# Patient Record
Sex: FEMALE | Race: BLACK OR AFRICAN AMERICAN
[De-identification: names, ages, dates, MRNs, and addresses within clinical notes are randomized per-mention and may not be internally consistent; named-entity substitution may affect disease eponyms.]

---

## 2021-06-30 ENCOUNTER — HOSPITAL ENCOUNTER (EMERGENCY)
Dept: HOSPITAL 63 - ER | Age: 31
Discharge: HOME | End: 2021-06-30
Payer: COMMERCIAL

## 2021-06-30 VITALS — BODY MASS INDEX: 32.56 KG/M2 | WEIGHT: 190.7 LBS | HEIGHT: 64 IN

## 2021-06-30 VITALS — DIASTOLIC BLOOD PRESSURE: 75 MMHG | SYSTOLIC BLOOD PRESSURE: 126 MMHG

## 2021-06-30 DIAGNOSIS — O99.331: ICD-10-CM

## 2021-06-30 DIAGNOSIS — Z3A.01: ICD-10-CM

## 2021-06-30 DIAGNOSIS — O99.711: Primary | ICD-10-CM

## 2021-06-30 DIAGNOSIS — L73.2: ICD-10-CM

## 2021-06-30 LAB
APTT PPP: YELLOW S
BACTERIA #/AREA URNS HPF: (no result) /HPF
BILIRUB UR QL STRIP: (no result)
FIBRINOGEN PPP-MCNC: (no result) MG/DL
GLUCOSE UR STRIP-MCNC: (no result) MG/DL
NITRITE UR QL STRIP: (no result)
RBC #/AREA URNS HPF: (no result) /HPF (ref 0–2)
SP GR UR STRIP: 1.01
SQUAMOUS #/AREA URNS LPF: (no result) /LPF
UROBILINOGEN UR-MCNC: 0.2 MG/DL
WBC #/AREA URNS HPF: (no result) /HPF (ref 0–4)

## 2021-06-30 PROCEDURE — 81025 URINE PREGNANCY TEST: CPT

## 2021-06-30 PROCEDURE — 81001 URINALYSIS AUTO W/SCOPE: CPT

## 2021-06-30 PROCEDURE — 99283 EMERGENCY DEPT VISIT LOW MDM: CPT

## 2021-06-30 NOTE — PHYS DOC
Past History


Past Medical History:  No Pertinent History


 (MICHAEL PUENTE)


Past Surgical History:  Appendectomy, Cholecystectomy, , Tonsillectomy


 (MICHAEL PUENTE)


Additional Smoking Information:  


PACK/DAY


Alcohol Use:  None


Drug Use:  None


 (MICHAEL PUENTE)





Adult General


Chief Complaint


Chief Complaint:  ABSCESS





HPI


HPI





Patient is a 31-year-old female who presents to the emergency department 

complaining of a lump on her right axilla.  Patient states this has been there 

for the past week.  Patient reports that it is painful.  Patient states she gets

these often and they usually go away with warm compresses.  Patient states she 

has not had 1 drained or had 1 needed to be drained.  Patient denies any other 

physical complaints or physical concerns.  Patient states that she is pregnant, 

reporting her last menstrual cycle ended on .  Patient states she 

went to the health department to confirm pregnancy by urine, was started on a 

prenatal vitamin.  Patient denies any vaginal bleeding, denies any vaginal 

discharge, denies any abdominal or pelvic pains.  Patient denies any nausea 

vomiting diarrhea fevers or chills.  Patient denies any rashes of her skin.  

Patient reports history of 3 C-sections, tonsils and adenoids removed, 

gallbladder removal.  Patient is a G4, P3.  Patient states she has an 

appointment on  to see her OB/GYN specialist.  Patient reports smoking 1 

packs of cigarettes a day, denies alcohol or drug use.


 (MICHAEL PUENTE)





Review of Systems


Review of Systems


14 body systems of review of systems have been reviewed.  See HPI for pertinent 

positives and negative responses, otherwise all other systems are negative, 

nonpertinent or noncontributory.


 (MICHAEL PUENTE)





Current Medications


Current Medications





Current Medications








 Medications


  (Trade)  Dose


 Ordered  Sig/Magalie  Start Time


 Stop Time Status Last Admin


Dose Admin


 


 Acetaminophen


  (Tylenol)  1,000 mg  1X  ONCE  21 20:00


 21 20:16 DC 21 20:21


1,000 MG








 (MICHAEL PUENTE)





Allergies


Allergies





Allergies








Coded Allergies Type Severity Reaction Last Updated Verified


 


  No Known Drug Allergies    16 No








 (MICHAEL PUENTE)





Physical Exam


Physical Exam





Constitutional: Well developed, well nourished, no acute distress, non-toxic 

appearance.  31-year-old female no apparent distress.


HENT: Normocephalic, atraumatic.


Eyes: Conjunctival normal, no discharge.


Neck: Normal range of motion.


Cardiovascular: Distal cap refill less than 2 seconds, no cyanosis appreciated.


Lungs & Thorax: No audible adventitious lung sounds appreciated, patient is in 

no respiratory distress.


Skin: Warm, dry, no erythema, no rash.  See extremity note for focused skin 

assessment.


Back: No tenderness.


Extremities: No tenderness, no cyanosis, no clubbing, ROM intact, no edema.  

Except for left axilla has a 1 cm diameter with nodule without central punctum, 

it is not fluctuant, consistent with hydradenitis suppurativa.  Patient has 

several remaining hidradenitis suppurativa scars of the left axilla.


Neurologic: Alert and oriented X 3, normal motor function, normal sensory functi

on, no focal deficits noted. 


Psychologic: Affect normal, judgement normal, mood normal. 


 (MICHAEL PUENTE)





Current Patient Data


Vital Signs





                                   Vital Signs








  Date Time  Temp Pulse Resp B/P (MAP) Pulse Ox O2 Delivery O2 Flow Rate FiO2


 


21 19:50 99.0 105 20 133/82 (99) 99 Room Air  








Lab Results





                                Laboratory Tests








Test


 21


20:10 21


20:18


 


Urine Collection Type Unknown   


 


Urine Color Yellow   


 


Urine Clarity Hazy   


 


Urine pH 7.0   


 


Urine Specific Gravity 1.015   


 


Urine Protein


 Neg


(NEG-TRACE) 





 


Urine Glucose (UA)


 Neg mg/dL


(NEG) 





 


Urine Ketones (Stick)


 Neg mg/dL


(NEG) 





 


Urine Blood Mod (NEG)   


 


Urine Nitrite Neg (NEG)   


 


Urine Bilirubin Neg (NEG)   


 


Urine Urobilinogen Dipstick


 0.2 mg/dL (0.2


mg/dL) 





 


Urine Leukocyte Esterase Neg (NEG)   


 


Urine RBC


 1-2 /HPF (0-2)


 





 


Urine WBC


 Occ /HPF (0-4)


 





 


Urine Squamous Epithelial


Cells Mod /LPF  


 





 


Urine Bacteria


 Few /HPF


(0-FEW) 





 


POC Urine HCG, Qualitative


 


 hcg positive


(Negative)








 (MICHAEL PUENTE)





EKG


EKG


[]


 (MICHAEL PUENTE)





Radiology/Procedures


Radiology/Procedures


[]


 (MICHAEL PUENTE)





Heart Score


C/O Chest Pain:  No


Risk Factors:


Risk Factors:  DM, Current or recent (<one month) smoker, HTN, HLP, family 

history of CAD, obesity.


Risk Scores:


Risk Factors:  DM, Current or recent (<one month) smoker, HTN, HLP, family 

history of CAD, obesity.


 (MICHAEL PUNETE)





Course & Med Decision Making


Course & Med Decision Making


Pertinent Labs and Imaging studies reviewed. (See chart for details)





31-year-old female, vital signs reviewed, presents emergency department 

complaints of a lump in her left axilla for the past week or so.  Physical 

examination consistent with hidradenitis suppurativa of the left axilla.  

Patient is pregnant, denies any vaginal discharge itching or STI concerns, 

denies vaginal bleeding.  Will order urinalysis assay to rule out urinary tract 

infection.





The patient's urine was not infected, discussed with patient will start on 1% 

clindamycin ointment to apply twice a day to axilla, continue using warm 

compresses, follow-up with PCP soon for reexamination and consideration of 

surgical removal of nodule.  Discussed with patient smoking cigarettes while 

pregnant, urged patient to seek cigarette smoking cessation help.





Patient gave verbal understanding of discharge home instructions, antibiotic 

use, follow-up with PCP soon, states she will keep her OB/GYN appointment coming

 up soon, return to ER precautions or concerns, patient had no further questions

 or concerns and was discharged home without incident.


 (MICHAEL PUENTE)





Dragon Disclaimer


Dragon Disclaimer


This electronic medical record was generated, in whole or in part, using a voice

 recognition dictation system.


 (MICHAEL PUENTE)





Departure


Departure:


Impression:  


   Primary Impression:  


   First trimester pregnancy


   Additional Impressions:  


   Hidradenitis suppurativa of left axilla


   Cigarette smoker


Disposition:   HOME / SELF CARE / HOMELESS


Condition:  GOOD


Referrals:  


PCP,NO (PCP)


Patient Instructions:  Hidradenitis Suppurativa, Sweat Gland Abscess, Medicines 

During Pregnancy





Additional Instructions:  


You have been seen in the emergency department for a lump in your left armpit.  

This does not appear to be an abscess, however I believe this is a clogged sweat

 gland this is called hidradenitis suppurativa.  Please continue to apply warm 

compresses 2-3 times a day for comfort.  I am prescribing you a topical 

antibiotic to place on, this is safe for your pregnancy.  Please keep your 

OB/GYN appointment on .  You had indicated you do not have a primary care

 provider, you may consider using the [facility] group located at [facility]

 [place] [address] [place] [address], their telephone number is 

[phone].  Please call them soon for an appointment for a reevaluation of 

the clogged sweat gland of your left armpit this week.  Please return to the 

emergency department for worsening symptoms or other concerns.  You are given 1 

g of Tylenol for pain in the emergency department.  Please do not use ibuprofen 

during pregnancy as this may cause complications.  Please use Tylenol for pain. 

 I encourage you to stop smoking cigarettes especially during pregnancy. 





EMERGENCY DEPARTMENT GENERAL DISCHARGE INSTRUCTIONS





Thank you for coming to [facility] (ED) today and trusting us

 with you 


care.  We trust that you had a positivie experience in our Emergency Department.

  If you 


wish to speak to the department management, you may call the director at 

[phone].





YOUR FOLLOW UP INSTRUCTIONS ARE AS FOLLOWS:





1.  Do you have a private Doctor?  If you do not have a private doctor, please 

ask for a 


resource list of physicians or clinics that may be able to assist you with 

follow up care.





2.  The Emergency Physician has interpreted your x-rays.  The X-Ray specialist 

will also 


review them.  If there is a change in the findings, you will be notified in 48 

hours when at 


all possible.





3.  A lab test or culture has been done, your results will be reviewed and you 

will be 


notified if you need a change in treatment.





ADDITIONAL INSTRUCTIONS AND INFORMATION:





1.  Your care today has been supervised by a physician who is specially trained 

in emergency 


care.  Many problems require more than one evaluation for a complete diagnosis 

and 


treatment.  We recommend that you schedule your follow up appointment as 

recommended to 


ensure complete treatment of you illness or injury.  If you are unable to obtain

 follow up 


care and continue to have a problem, or if your condition worsens, we recommend 

that you 


return to the ED.





2.  We are not able to safely determine your condition over the phone nor are we

 able to 


give sound medical advice over the phone.  For these safety reasons, if you call

 for medical 


advice we will ask you to come to the ED for further evaluation.





3.  If you have any questions regarding these discharge instructions please call

 the ED at 


[phone].





SAFETY INFORMATION:





In the interest of safety, wellness, and injury prevention; we encourage you to 

wear your 


sealbelt, if you smoke; quite smoking, and we encourage family to use a pro

tective helmet 


for bicycling and other sporting events that present an increased risk for head 

injury.





IF YOUR SYMPTOMS WORSEN OR NEW SYMPTOMS DEVELOP, OR YOU HAVE CONCERNS ABOUT YOUR

 CONDITION; 


OR IF YOUR CONDITION WORSENS WHILE YOU ARE WAITING FOR YOUR FOLLOW UP 

APPOINTMENT; EITHER 


CONTACT YOUR PRIMARY CARE DOCTOR, THE PHYSICIAN WHOSE NAME AND NUMBER YOU WERE 

GIVEN, OR 


RETURN TO THE ED IMMEDIATELY.


Scripts


Clindamycin Phosphate (CLINDAMYCIN PHOSPHATE) 60 Ml Lotion


1 FELECIA TP BID for skin infection, #60 ML 0 Refills


   Prov: MICHAEL PUENTE         21





Attending Signature


Attending Signature


I have reviewed the PA/NP's note and plan of care. I was available for 

consultation as needed during the patient's visit in the emergency department. I

 agree with the clinical impression, plan, and disposition.


 (MICHAEL SCOTT DO)





Problem Qualifiers











MICHAEL PUENTE       2021 21:00


MICHAEL SCOTT DO             2021 23:45